# Patient Record
Sex: MALE | HISPANIC OR LATINO | URBAN - METROPOLITAN AREA
[De-identification: names, ages, dates, MRNs, and addresses within clinical notes are randomized per-mention and may not be internally consistent; named-entity substitution may affect disease eponyms.]

---

## 2021-12-17 ENCOUNTER — EMERGENCY (EMERGENCY)
Facility: HOSPITAL | Age: 31
LOS: 1 days | Discharge: HOME | End: 2021-12-17
Attending: STUDENT IN AN ORGANIZED HEALTH CARE EDUCATION/TRAINING PROGRAM
Payer: SELF-PAY

## 2021-12-17 VITALS
RESPIRATION RATE: 18 BRPM | DIASTOLIC BLOOD PRESSURE: 92 MMHG | SYSTOLIC BLOOD PRESSURE: 156 MMHG | HEART RATE: 57 BPM | TEMPERATURE: 98 F | OXYGEN SATURATION: 97 %

## 2021-12-17 PROCEDURE — 99283 EMERGENCY DEPT VISIT LOW MDM: CPT | Mod: 25

## 2021-12-17 PROCEDURE — 12013 RPR F/E/E/N/L/M 2.6-5.0 CM: CPT

## 2021-12-17 PROCEDURE — 90715 TDAP VACCINE 7 YRS/> IM: CPT

## 2021-12-17 PROCEDURE — 90471 IMMUNIZATION ADMIN: CPT

## 2021-12-17 RX ORDER — TETANUS TOXOID, REDUCED DIPHTHERIA TOXOID AND ACELLULAR PERTUSSIS VACCINE, ADSORBED 5; 2.5; 8; 8; 2.5 [IU]/.5ML; [IU]/.5ML; UG/.5ML; UG/.5ML; UG/.5ML
0.5 SUSPENSION INTRAMUSCULAR ONCE
Refills: 0 | Status: COMPLETED | OUTPATIENT
Start: 2021-12-17 | End: 2021-12-17

## 2021-12-17 RX ADMIN — TETANUS TOXOID, REDUCED DIPHTHERIA TOXOID AND ACELLULAR PERTUSSIS VACCINE, ADSORBED 0.5 MILLILITER(S): 5; 2.5; 8; 8; 2.5 SUSPENSION INTRAMUSCULAR at 23:05

## 2021-12-17 NOTE — ED ADULT NURSE NOTE - LANGUAGE ASSISTANCE NEEDED
Called [facility]. Received P[facility] information. Waiting for them to fax to other facilities.    Yes-Patient/Caregiver accepts free interpretation services...

## 2021-12-17 NOTE — ED PROVIDER NOTE - ATTENDING CONTRIBUTION TO CARE
30 yo well appearing male presents for evaluation of small laceration to central forehead secondary to direct blow by a falling pipe. I personally saw the patient with the PA, and completed the key components of the history and physical exam. I then discussed the management plan with the PA.

## 2021-12-17 NOTE — ED PROVIDER NOTE - CLINICAL SUMMARY MEDICAL DECISION MAKING FREE TEXT BOX
laceration to head after head injury, [place] ct negative, lac superfical barely bleeding, will cover with dermabond, tetanus updated

## 2021-12-17 NOTE — ED PROVIDER NOTE - PHYSICAL EXAMINATION
HEENT: no raccoon eyes, no lopez sings, no hemotympanum, PERRL, EOMI, no nystagmus, no dental injuries  Neck: supple, no midline tenderness to palpation, nt to cervical spine paraspinal m,+ FROM, NEXUS negative, no abrasions, no ecchymosis  Chest: non tender, equal expansion bilaterally, no ecchymosis, no abrasions, seatbelt sign negative.  Lungs: CTA, good air entry bilaterally, no wheezing, no rales, no rhonchi  Abdomen: soft, non tender, no guarding, no rebound, no distention, no ecchymosis  Back: no midline tenderness to palpation   Extremities: atraumatic, + FROM, 5/5 strength  Skin: no rash,approx 4 cm superfical laceration of the left frontal no abrasion  Neuro: A & O x 3, clear speech, steady gait, cerebellar intact, no focal deficits, CN II-XII intact, neg pronator sign

## 2021-12-17 NOTE — ED ADULT TRIAGE NOTE - CHIEF COMPLAINT QUOTE
pt ambulated into ED with laceration to top of head after hitting head on pipe while at work. -LOC, -blood thinners. A&Ox4 in NAD.

## 2021-12-17 NOTE — ED PROVIDER NOTE - PATIENT PORTAL LINK FT
You can access the FollowMyHealth Patient Portal offered by [facility] by registering at the following website: [email or web address]. By joining [employer]’s FollowMyHealth portal, you will also be able to view your health information using other applications (apps) compatible with our system.

## 2021-12-17 NOTE — ED PROVIDER NOTE - OBJECTIVE STATEMENT
30 y/o male with no sign medical history presents to the ED c/o head injury. patient notes he was at work and he hit his head on a metal pole. No loc, no nausea, no vomiting, no blood thinners. Notes she was concerned because it was a geovani pole. Positive laceration of the left side of the forehead. Bleeding controlled. Denies, headache, lightheadedness, dizziness, blurry vision, loss of vision, neck pain, chest pain, abdominal pain, numbness, tingling of extremities. Last tetanus unknown.

## 2022-10-28 NOTE — ED ADULT NURSE NOTE - NS TRANSFER PATIENT BELONGINGS
Gurinder Sarmiento discharged to home accompanied by other Friend .   Patient provided with the following educational materials upon discharge:  AVS.   Valuables and belongings sent with patient.   discharge summary, discharge instructions, medications and follow up appointments reviewed with patient and other Friend .  Patient and other Friend  verbalized understanding   None